# Patient Record
Sex: FEMALE | Race: WHITE | NOT HISPANIC OR LATINO
[De-identification: names, ages, dates, MRNs, and addresses within clinical notes are randomized per-mention and may not be internally consistent; named-entity substitution may affect disease eponyms.]

---

## 2022-11-16 PROBLEM — Z00.00 ENCOUNTER FOR PREVENTIVE HEALTH EXAMINATION: Status: ACTIVE | Noted: 2022-11-16

## 2022-11-18 ENCOUNTER — APPOINTMENT (OUTPATIENT)
Dept: OTOLARYNGOLOGY | Facility: CLINIC | Age: 32
End: 2022-11-18

## 2022-11-18 VITALS
WEIGHT: 165 LBS | HEIGHT: 64 IN | BODY MASS INDEX: 28.17 KG/M2 | SYSTOLIC BLOOD PRESSURE: 131 MMHG | TEMPERATURE: 97.7 F | HEART RATE: 116 BPM | DIASTOLIC BLOOD PRESSURE: 78 MMHG | OXYGEN SATURATION: 95 %

## 2022-11-18 DIAGNOSIS — H61.23 IMPACTED CERUMEN, BILATERAL: ICD-10-CM

## 2022-11-18 PROCEDURE — 99202 OFFICE O/P NEW SF 15 MIN: CPT

## 2022-11-18 NOTE — PHYSICAL EXAM
[de-identified] : b copious cerumen soaked in hydrogen peroxide removed atraumatically with suction under microscope, b TMs nl  [Normal] : no nystagmus [de-identified] : gait steady

## 2022-11-18 NOTE — ASSESSMENT
[FreeTextEntry1] : b cerumen impaction \par -cerumen removed\par -ears felt better\par -avoid qtip usage\par -debrox drops if ears feel plugged in the future\par RTC as needed\par

## 2022-11-18 NOTE — PROCEDURE
[Cerumen Impaction] : Cerumen Impaction [Same] : same as the Pre Op Dx. [] : Removal of Cerumen [FreeTextEntry5] : b copious cerumen soaked in hydrogen peroxide removed atraumatically with suction under microscope, b TMs nl

## 2022-11-18 NOTE — HISTORY OF PRESENT ILLNESS
[de-identified] : 33 y/o F presenting with b aural fullness for the past 3 days. She feels like she may have cerumen impaction. She used qtips in attempt to clean her ears, but was unsuccessful. She has had no trouble with her ears in the past. She denies otalgia or drainage. She denies changes in hearing. Nonsmoker.

## 2024-12-17 ENCOUNTER — EMERGENCY (EMERGENCY)
Facility: HOSPITAL | Age: 34
LOS: 1 days | Discharge: LEFT BEFORE DISCHARGE | End: 2024-12-17
Admitting: EMERGENCY MEDICINE
Payer: COMMERCIAL

## 2024-12-17 VITALS
SYSTOLIC BLOOD PRESSURE: 140 MMHG | HEART RATE: 130 BPM | OXYGEN SATURATION: 98 % | RESPIRATION RATE: 16 BRPM | TEMPERATURE: 98 F | DIASTOLIC BLOOD PRESSURE: 90 MMHG

## 2024-12-17 VITALS — HEART RATE: 117 BPM | RESPIRATION RATE: 18 BRPM | OXYGEN SATURATION: 98 %

## 2024-12-17 DIAGNOSIS — R00.0 TACHYCARDIA, UNSPECIFIED: ICD-10-CM

## 2024-12-17 DIAGNOSIS — M79.10 MYALGIA, UNSPECIFIED SITE: ICD-10-CM

## 2024-12-17 DIAGNOSIS — Z53.29 PROCEDURE AND TREATMENT NOT CARRIED OUT BECAUSE OF PATIENT'S DECISION FOR OTHER REASONS: ICD-10-CM

## 2024-12-17 DIAGNOSIS — R50.9 FEVER, UNSPECIFIED: ICD-10-CM

## 2024-12-17 PROCEDURE — 99284 EMERGENCY DEPT VISIT MOD MDM: CPT

## 2024-12-17 RX ORDER — SODIUM CHLORIDE 9 MG/ML
1000 INJECTION, SOLUTION INTRAMUSCULAR; INTRAVENOUS; SUBCUTANEOUS ONCE
Refills: 0 | Status: ACTIVE | OUTPATIENT
Start: 2024-12-17 | End: 2024-12-17

## 2024-12-17 RX ORDER — ACETAMINOPHEN 500MG 500 MG/1
650 TABLET, COATED ORAL ONCE
Refills: 0 | Status: ACTIVE | OUTPATIENT
Start: 2024-12-17 | End: 2024-12-17

## 2024-12-17 NOTE — ED PROVIDER NOTE - CLINICAL SUMMARY MEDICAL DECISION MAKING FREE TEXT BOX
34-year-old female presents for a tampon that has been removed but has been in for 9 hours with symptoms of 34-year-old female presents for a tampon that has been removed but has been in for 9 hours with symptoms ofSubjective fevers, myalgias, nausea  On arrival vital signs are significant for tachycardia in the 130s, rest of vitals are unremarkable  Patient was given down, therefore no official physical exam was done, however generalized exam is unremarkable  1 patient called back into the room she states that she wants to just go home.  Repeat heart rate was 117.   patient walked out of the emergency room.

## 2024-12-17 NOTE — ED PROVIDER NOTE - OBJECTIVE STATEMENT
34-year-old female, no medical history, presents this emergency department for subjective fevers, myalgias night, after a tampon was in for about 9 hours.  Patient states that it was the last day of her menses, and forgot that it was in.  Took it out before she came into the ER, however today, about 5 hours prior to arrival she started developing her symptoms.  Is worried about toxic shock syndrome.  Denies any rashes.  Has not tried medications for symptomatic relief.  Denies any vaginal odor or discharge.  Patient states that when she started developing the symptoms she also started feeling like her heart was racing.  Denies past recent episodes.

## 2024-12-17 NOTE — ED ADULT NURSE NOTE - OBJECTIVE STATEMENT
aox3, breathing even and unlabored on RA presenting with anxiety after leaving tampon in place for 9 hours today. patient removed tampon prior to ED arrival. upon assessment, patient stated she felt fine and she would like to go home.

## 2024-12-17 NOTE — ED ADULT TRIAGE NOTE - CHIEF COMPLAINT QUOTE
Pt states tampon in for about 9 hrs today. pt states felt febrile at work with lower back pain, concerned for toxic shock syndrome.

## 2024-12-26 ENCOUNTER — TRANSCRIPTION ENCOUNTER (OUTPATIENT)
Age: 34
End: 2024-12-26